# Patient Record
Sex: MALE | Race: WHITE | NOT HISPANIC OR LATINO | Employment: OTHER | URBAN - METROPOLITAN AREA
[De-identification: names, ages, dates, MRNs, and addresses within clinical notes are randomized per-mention and may not be internally consistent; named-entity substitution may affect disease eponyms.]

---

## 2021-01-26 NOTE — PATIENT DISCUSSION
PHOTOGRAPHS: I have reviewed the external ocular photographs of this patient which show the following: lesions involving the right and left lower eyelids.

## 2021-01-26 NOTE — PATIENT DISCUSSION
Biopsy Lesion Eyelid: The patient had a lesion on the left lower eyelid. After informed consent the patient received a local anesthetic injection of 1% lidocaine with epinephrine 1:100,000 units. A section of the mass was excised with Wood scissors and sent to the pathology laboratory for evaluation. The patient was given written post operative care instructions and a prescription for antibiotic ointment. The patient was asked to call our office within 10 days for follow up if the patient had not heard from our office regarding the result of the biopsy before that time.

## 2021-01-26 NOTE — PATIENT DISCUSSION
The patient had a lesion on the right lower eyelid. After informed consent the lesion was anesthetized with local anesthetic, 1% lidocane with epinephrine 1:100,000 units. Sterile technique was used to remove the lesion with Wood scissors. Antibiotic ointment was used to treat the area where lesion was removed. Lesion was sent to pathology for analysis. The patient was given written post operative wound care instructions and a prescription for antibiotic ointment. The patient was asked to call  within 10 days if they had not been otherwise called by our office with the result of the biopsy.

## 2021-12-09 ENCOUNTER — EMERGENCY VISIT (OUTPATIENT)
Dept: URBAN - METROPOLITAN AREA CLINIC 46 | Facility: CLINIC | Age: 31
End: 2021-12-09

## 2021-12-09 DIAGNOSIS — T15.02XA: ICD-10-CM

## 2021-12-09 PROCEDURE — 65222 REMOVE FOREIGN BODY FROM EYE: CPT

## 2021-12-09 PROCEDURE — 92012 INTRM OPH EXAM EST PATIENT: CPT

## 2021-12-09 ASSESSMENT — VISUAL ACUITY
OD_SC: 20/20-1
OS_SC: 20/25-1

## 2021-12-09 ASSESSMENT — TONOMETRY: OD_IOP_MMHG: 15

## 2021-12-09 NOTE — PROCEDURE NOTE: CLINICAL
PROCEDURE NOTE: Removal of Corneal FB at Slit Lamp #1 OS. Diagnosis: Corneal Foreign Body. Anesthesia: Topical. The patient, the procedure, and the correct site were identified initially. Prior to treatment, the risks/benefits/alternatives were discussed. The patient wished to proceed with procedure. Corneal foreign body was removed using a 25 gauge needle at the slit lamp. Patient tolerated procedure well. There were no complications. Post-op instructions given. The residual rust ring was carefully buffed out of the corneal stroma. Patient tolerated procedure well. Gabriela Kelly PROCEDURE NOTE: Bandage Contact Lens #1 OS. Diagnosis: Corneal Foreign Body. A therapeutic soft contact lens of the of the appropriate size and base curve was selected and then applied to the cornea. The lens fit well and moved appropriately. Gabriela Kelly

## 2021-12-16 ENCOUNTER — FOLLOW UP (OUTPATIENT)
Dept: URBAN - METROPOLITAN AREA CLINIC 46 | Facility: CLINIC | Age: 31
End: 2021-12-16

## 2021-12-16 DIAGNOSIS — T15.02XD: ICD-10-CM

## 2021-12-16 PROCEDURE — 92012 INTRM OPH EXAM EST PATIENT: CPT

## 2021-12-16 ASSESSMENT — VISUAL ACUITY
OD_SC: 20/20
OS_SC: 20/25+2